# Patient Record
Sex: FEMALE | Race: WHITE | NOT HISPANIC OR LATINO
[De-identification: names, ages, dates, MRNs, and addresses within clinical notes are randomized per-mention and may not be internally consistent; named-entity substitution may affect disease eponyms.]

---

## 2017-02-23 ENCOUNTER — TRANSCRIPTION ENCOUNTER (OUTPATIENT)
Age: 32
End: 2017-02-23

## 2021-04-05 ENCOUNTER — RESULT REVIEW (OUTPATIENT)
Age: 36
End: 2021-04-05

## 2021-11-22 ENCOUNTER — APPOINTMENT (OUTPATIENT)
Dept: PEDIATRIC ALLERGY IMMUNOLOGY | Facility: CLINIC | Age: 36
End: 2021-11-22
Payer: COMMERCIAL

## 2021-11-22 DIAGNOSIS — R60.0 LOCALIZED EDEMA: ICD-10-CM

## 2021-11-22 DIAGNOSIS — L23.9 ALLERGIC CONTACT DERMATITIS, UNSPECIFIED CAUSE: ICD-10-CM

## 2021-11-22 PROCEDURE — 99203 OFFICE O/P NEW LOW 30 MIN: CPT

## 2021-11-22 NOTE — HISTORY OF PRESENT ILLNESS
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 Pt has not yet gone through personal products used that may contain these substances. She does not wear much makeup but does keep her nails with varnish and adhesive on them and this may be a trigger.  She has not changed any other personal products\par \par digital images provided by the patient were consistent with lois-orbital dermatitis with minimal lois-orbital angioedema\par \par Pt was given Lotemax ointment for her eyes which helps significantly.

## 2021-11-22 NOTE — REASON FOR VISIT
[Routine Follow-Up] : a routine follow-up visit for [Allergy Evaluation/ Skin Testing] : allergy evaluation and or skin testing [Rash] : rash

## 2021-11-22 NOTE — SOCIAL HISTORY
[FreeTextEntry2] : Office Setting [House] : [unfilled] lives in a house  [Soaps] : soaps [Laundry Detergents] : laundry detergents [Cosmetics] : cosmetics [] :  [Smokers in Household] : there are no smokers in the home

## 2021-11-22 NOTE — PHYSICAL EXAM
[Well Nourished] : well nourished [Normal TMs] : both tympanic membranes were normal [No Thrush] : no thrush [Boggy Nasal Turbinates] : no boggy and/or pale nasal turbinates [Posterior Pharyngeal Cobblestoning] : no posterior pharyngeal cobblestoning [No Neck Mass] : no neck mass was observed [Normal Rate and Effort] : normal respiratory rhythm and effort [Normal Rate] : heart rate was normal  [Normal Cervical Lymph Nodes] : cervical [de-identified] : Minimal lois-orbital angioedema [de-identified] : Minimal lois-orbital erythema

## 2021-11-22 NOTE — ASSESSMENT
[FreeTextEntry1] : 36 yr oid with previous history of hand dermatitis and previous positive patch testing to PEG, Benzoyl peroxide, formaldehyde resin.Linalool and butyl Phenol resin now with several month history of periorbital dermatitis\par \par Likely from nail cosmetics\par \par Discuss with patient role of personal products in production of periorbital dermatitis \par \par Pt will look over list of products such as detergents, dryer sheets, nail products and eliminate and will also look at list of shampoos, hair products etc that may contain the above allergens\par \par Will continue to use Lotemax PRN\par \par Total MD time spent on this encounter was 35 minutes.  This includes time devoted to preparing to see the patient with review of previous medical record, obtaining medical history, performing physical exam, counseling and patient education with patient and family, ordering medications and lab studies, documentation in the medical record and coordination of care.\par \par \par

## 2021-11-22 NOTE — REVIEW OF SYSTEMS
[Eye Itching] : itchy eyes [Puffy Eyelids] : puffy ~T eyelids [Swollen Eyelids] : ~T ~L swollen eyelids [Pruritus] : pruritus [Swelling] : swelling [Nl] : Gastrointestinal